# Patient Record
Sex: FEMALE | Race: WHITE | Employment: OTHER | URBAN - NONMETROPOLITAN AREA
[De-identification: names, ages, dates, MRNs, and addresses within clinical notes are randomized per-mention and may not be internally consistent; named-entity substitution may affect disease eponyms.]

---

## 2017-10-17 ENCOUNTER — HOSPITAL ENCOUNTER (OUTPATIENT)
Dept: NUTRITION | Age: 72
Discharge: HOME | End: 2017-10-17
Payer: MEDICARE

## 2017-10-17 PROCEDURE — 97802 MEDICAL NUTRITION INDIV IN: CPT

## 2017-10-17 NOTE — PROGRESS NOTES
MNT provided for Diabetes    Limited adherence to nutrition-related recommendations related to Lack of value or competing values for behavior change as evidenced by Previous goals unmet/expected outcomes not achieved    Client data    Ht: 68\" Wt: 215# BMI: 32.7 (obese I)   Weight changes: unk ABW: 72.2 kg   BMR: 1333 calories Est. total calorie needs: ~1400       Client overall goal for weight is for losses towards a healthier BMI. Current eating pattern is fairly consistent, per recall, but as meeting progresses, more abberrations in timing, and choices are revealed. Use of whole grains, whole fruits and vegetables appear near recommended quantities. There is an excess of portions (likely) per recall, as client doesn't actively measure foods, and doesn't routinely look at food labels. She has had diabetes education in past (2014). Activity is lower than recommendations, with 3 times a week walking reported. Only on Glimepiride, formerly on metformin as well. Doesn't want to go on insulin. Not routinely checking glucose. Not routinely having a HS snack. Client presents for MNT today with self. Client was educated on carbohydrate counting with the following goals at meals and snacks:  Breakfast: 45 grams  Lunch: 45 grams  Supper: 30-45 grams  Bedtime Snack: 15 grams    Client received information on limiting fat in diet to lessen heart disease risk, with goals of: Total Fat: 58 grams  Saturated Fat: 11 grams  Trans Fat: 0 grams daily     Discussed and provided literature on:  Carbohydrate counting, utilizing materials: Choose Your Foods book for meal planning, Food Label, MyPlate and individual carbohydrate counts (as noted above). Reinforced importance of measuring portions and reading food labels for Total Carbohydrate and Serving Sizes. Discussed importance of good record keeping for her glucose values and checking her glucose as her doctor has prescribed (doesn't like pricking fingers).   She

## 2020-01-15 ENCOUNTER — HOSPITAL ENCOUNTER (OUTPATIENT)
Dept: WOMENS IMAGING | Age: 75
Discharge: HOME | End: 2020-01-17
Payer: MEDICARE

## 2020-01-15 PROCEDURE — 77080 DXA BONE DENSITY AXIAL: CPT

## 2020-01-21 ENCOUNTER — HOSPITAL ENCOUNTER (OUTPATIENT)
Dept: WOMENS IMAGING | Age: 75
Discharge: HOME | End: 2020-01-23
Payer: MEDICARE

## 2020-01-21 PROCEDURE — 77067 SCR MAMMO BI INCL CAD: CPT

## 2020-01-31 ENCOUNTER — HOSPITAL ENCOUNTER (OUTPATIENT)
Dept: WOMENS IMAGING | Age: 75
Discharge: HOME | End: 2020-02-02
Payer: MEDICARE

## 2020-01-31 ENCOUNTER — HOSPITAL ENCOUNTER (OUTPATIENT)
Dept: ULTRASOUND IMAGING | Age: 75
Discharge: HOME | End: 2020-02-02
Payer: MEDICARE

## 2020-01-31 PROCEDURE — 76641 ULTRASOUND BREAST COMPLETE: CPT

## 2020-01-31 PROCEDURE — 77065 DX MAMMO INCL CAD UNI: CPT

## 2020-07-31 ENCOUNTER — HOSPITAL ENCOUNTER (OUTPATIENT)
Dept: ULTRASOUND IMAGING | Age: 75
Discharge: HOME | End: 2020-08-02
Payer: MEDICARE

## 2020-07-31 PROCEDURE — 76641 ULTRASOUND BREAST COMPLETE: CPT

## 2020-08-12 ENCOUNTER — HOSPITAL ENCOUNTER (OUTPATIENT)
Dept: LAB | Age: 75
Setting detail: SPECIMEN
Discharge: HOME | End: 2020-08-12
Payer: MEDICARE

## 2020-08-12 DIAGNOSIS — Z01.818 PREOP TESTING: Primary | ICD-10-CM

## 2020-08-12 PROCEDURE — U0003 INFECTIOUS AGENT DETECTION BY NUCLEIC ACID (DNA OR RNA); SEVERE ACUTE RESPIRATORY SYNDROME CORONAVIRUS 2 (SARS-COV-2) (CORONAVIRUS DISEASE [COVID-19]), AMPLIFIED PROBE TECHNIQUE, MAKING USE OF HIGH THROUGHPUT TECHNOLOGIES AS DESCRIBED BY CMS-2020-01-R: HCPCS

## 2020-08-14 LAB — SARS-COV-2, NAA: NOT DETECTED

## 2020-08-15 ENCOUNTER — TELEPHONE (OUTPATIENT)
Dept: PRIMARY CARE CLINIC | Age: 75
End: 2020-08-15

## 2020-08-19 ENCOUNTER — HOSPITAL ENCOUNTER (OUTPATIENT)
Dept: ULTRASOUND IMAGING | Age: 75
Discharge: HOME | End: 2020-08-21
Payer: MEDICARE

## 2020-08-19 ENCOUNTER — HOSPITAL ENCOUNTER (OUTPATIENT)
Dept: WOMENS IMAGING | Age: 75
Discharge: HOME | End: 2020-08-21
Payer: MEDICARE

## 2020-08-19 VITALS
SYSTOLIC BLOOD PRESSURE: 197 MMHG | OXYGEN SATURATION: 98 % | HEART RATE: 68 BPM | DIASTOLIC BLOOD PRESSURE: 78 MMHG | RESPIRATION RATE: 12 BRPM

## 2020-08-19 PROCEDURE — 77065 DX MAMMO INCL CAD UNI: CPT

## 2020-08-19 PROCEDURE — 88305 TISSUE EXAM BY PATHOLOGIST: CPT

## 2020-08-19 PROCEDURE — 2500000003 HC RX 250 WO HCPCS: Performed by: RADIOLOGY

## 2020-08-19 PROCEDURE — 2709999900 US BREAST BIOPSY W LOC DEVICE 1ST LESION LEFT

## 2020-08-19 RX ORDER — LIDOCAINE HYDROCHLORIDE 10 MG/ML
INJECTION, SOLUTION EPIDURAL; INFILTRATION; INTRACAUDAL; PERINEURAL
Status: COMPLETED | OUTPATIENT
Start: 2020-08-19 | End: 2020-08-19

## 2020-08-19 RX ADMIN — LIDOCAINE HYDROCHLORIDE 10 ML: 10 INJECTION, SOLUTION EPIDURAL; INFILTRATION; INTRACAUDAL; PERINEURAL at 11:31

## 2020-08-21 LAB — SURGICAL PATHOLOGY REPORT: NORMAL

## 2020-09-15 ENCOUNTER — HOSPITAL ENCOUNTER (OUTPATIENT)
Dept: LAB | Age: 75
Setting detail: SPECIMEN
Discharge: HOME | End: 2020-09-15
Payer: MEDICARE

## 2020-09-15 DIAGNOSIS — Z01.818 PREOP TESTING: Primary | ICD-10-CM

## 2020-09-15 PROCEDURE — C9803 HOPD COVID-19 SPEC COLLECT: HCPCS

## 2020-09-15 PROCEDURE — U0003 INFECTIOUS AGENT DETECTION BY NUCLEIC ACID (DNA OR RNA); SEVERE ACUTE RESPIRATORY SYNDROME CORONAVIRUS 2 (SARS-COV-2) (CORONAVIRUS DISEASE [COVID-19]), AMPLIFIED PROBE TECHNIQUE, MAKING USE OF HIGH THROUGHPUT TECHNOLOGIES AS DESCRIBED BY CMS-2020-01-R: HCPCS

## 2020-09-17 LAB — SARS-COV-2, NAA: NOT DETECTED

## 2021-02-02 ENCOUNTER — HOSPITAL ENCOUNTER (OUTPATIENT)
Dept: LAB | Age: 76
Setting detail: SPECIMEN
Discharge: HOME | End: 2021-02-02
Payer: MEDICARE

## 2021-02-02 PROCEDURE — U0003 INFECTIOUS AGENT DETECTION BY NUCLEIC ACID (DNA OR RNA); SEVERE ACUTE RESPIRATORY SYNDROME CORONAVIRUS 2 (SARS-COV-2) (CORONAVIRUS DISEASE [COVID-19]), AMPLIFIED PROBE TECHNIQUE, MAKING USE OF HIGH THROUGHPUT TECHNOLOGIES AS DESCRIBED BY CMS-2020-01-R: HCPCS

## 2021-02-02 PROCEDURE — U0005 INFEC AGEN DETEC AMPLI PROBE: HCPCS

## 2021-02-02 PROCEDURE — C9803 HOPD COVID-19 SPEC COLLECT: HCPCS

## 2021-02-03 LAB
SARS-COV-2, RAPID: NORMAL
SARS-COV-2: NORMAL
SARS-COV-2: NOT DETECTED
SOURCE: NORMAL

## 2021-02-04 ENCOUNTER — TELEPHONE (OUTPATIENT)
Dept: PRIMARY CARE CLINIC | Age: 76
End: 2021-02-04

## 2021-06-01 ENCOUNTER — HOSPITAL ENCOUNTER (OUTPATIENT)
Age: 76
Discharge: HOME | End: 2021-06-03
Payer: MEDICARE

## 2021-06-01 ENCOUNTER — HOSPITAL ENCOUNTER (OUTPATIENT)
Dept: GENERAL RADIOLOGY | Age: 76
Discharge: HOME | End: 2021-06-03
Payer: MEDICARE

## 2021-06-01 DIAGNOSIS — M54.9 BACK PAIN, UNSPECIFIED BACK LOCATION, UNSPECIFIED BACK PAIN LATERALITY, UNSPECIFIED CHRONICITY: ICD-10-CM

## 2021-06-01 PROCEDURE — 72100 X-RAY EXAM L-S SPINE 2/3 VWS: CPT

## 2021-06-07 ENCOUNTER — HOSPITAL ENCOUNTER (OUTPATIENT)
Dept: GENERAL RADIOLOGY | Age: 76
Discharge: HOME | End: 2021-06-09
Payer: MEDICARE

## 2021-06-07 ENCOUNTER — HOSPITAL ENCOUNTER (OUTPATIENT)
Age: 76
Discharge: HOME | End: 2021-06-09
Payer: MEDICARE

## 2021-06-07 DIAGNOSIS — R07.81 RIB PAIN: ICD-10-CM

## 2021-06-07 PROCEDURE — 72072 X-RAY EXAM THORAC SPINE 3VWS: CPT

## 2021-06-08 ENCOUNTER — HOSPITAL ENCOUNTER (OUTPATIENT)
Dept: PHYSICAL THERAPY | Age: 76
Setting detail: THERAPIES SERIES
Discharge: HOME | End: 2021-06-08
Payer: MEDICARE

## 2021-06-08 PROCEDURE — G0283 ELEC STIM OTHER THAN WOUND: HCPCS

## 2021-06-08 PROCEDURE — 97161 PT EVAL LOW COMPLEX 20 MIN: CPT

## 2021-06-08 PROCEDURE — 97110 THERAPEUTIC EXERCISES: CPT

## 2021-06-08 ASSESSMENT — PAIN SCALES - QUEBEC BACK PAIN DISABILITY SCALE
THROW A BALL: 5
REACH UP TO HIGH SHELVES: 1
STAND UP FOR 20 TO 30 MINUTES: 3
PULL OR PUSH HEAVY DOORS: 2
TAKE FOOD OUT OF THE REFRIGERATOR: 2
PUT ON SOCKS OR PANYHOSE: 2
MAKE YOUR BED: 3
TOTAL SCORE: 60
WALK A FEW BLOCKS OR 300 TO 400M: 3
LIFT AND CARRY A HEAVY SUITCASE: 4
RIDE IN A CAR: 2
MOVE A CHAIR: 2
SIT IN A CHAIR FOR SEVERAL HOURS: 4
QUEBEC CMS MODIFIER: CL
TURN OVER IN BED: 2
WALK SEVERAL KILOMETERS  OR MILES: 4
CLIMB ONE FLIGHT OF STAIRS: 3
CARRY TWO BAGS OF GROCERIES: 2
SLEEP THROUGH THE NIGHT: 4
GET OUT OF BED: 3
RUN ONE BLOCK OR 100M: 5
BEND OVER TO CLEAN THE BATHTUB: 4
QUEBEC DISABILITY INDEX: 60-79%

## 2021-06-08 NOTE — PLAN OF CARE
[facility]           Phone: [phone]             Outpatient Physical Therapy  Fax: [phone]                                           Date: 2021  Patient: Rose Patino : 1945 [facility] #: [id number]   Referring Practitioner:  ELIZ Retana Referral Date:  21       [x] Plan of Care   [] Updated Plan of Care    Dates of Service to Include: 2021 to 21    Diagnosis:  Lumbar back pain, M54.5    Rehab (Treatment) Diagnosis:  Low back pain             Onset Date:  21    Attendance  Total # of Visits to Date: 1 No Show: 0 Canceled Appointment: 0    Assessment  Assessment: Patient is 77year old female with dx of lumbar back pain who presents with increased pain 5/10 on average in mid and lower back. Pt sits with thoracic kyphosis and lumbar hyperlordosis. TTP L1-5, S1, B PSIS and lumbar paraspinals. Patient with decresaed lumbar AROM:flexion: to toes with pain; B SB to lateral knee joint line with increased pain. Patient with decreased core strength 3+/5 grossly and decreased B LE strength:Hip flexion: 4-/5, abd/add: 4/5; Knee flex/ext: 4/5. (-) B JOSÉ MIGUEL, SLR, slump tests; good pelvic alignment, relief with manual lumbar traction in supine. Patient to benefit from physical therapy 2x/wk for 6 weeks to improve core strength and lumbar stability and to decrease pain to return to PLOF. Goals  Short term goals  Time Frame for Short term goals: 3 weeks  Short term goal 1: Patient to initiate HEP for improved lumbar aROM and core strength. Short term goal 2: Patient to be instructed in core strengthening exercises to improve lumbar stability. Short term goal 3: Initiate manual techniques/modalities prn to decrease pain and improve mobility. Long term goals  Time Frame for Long term goals : 6 weeks  Long term goal 1: Patient to be independent and compliant with HEP.   Long term goal 2: Patient to have improved core and B hip strength >/=4/5 grossly for improved lumbar stability. Long term goal 3: Patient to report overall decrease in pain </=2/10 at worst to improve QOL.      Prognosis  Prognosis: Good    Treatment Plan   Times per week: 2  Plan weeks: 6  [x] HP/CP      [x] Electrical Stim   [x] Therapeutic Exercise      [x] Gait Training  [x] Aquatics   [] Ultrasound         [x] Patient Education/HEP   [x] Manual Therapy  [] Traction    [x] Neuro-radha        [x] Soft Tissue Mobs            [] Home TENS  [] Iontophoresis    [] Orthotic casting/fitting      [] Dry Needling             Electronically signed by: Manan Patrick PT, DPT    Date: 6/8/2021      ______________________________________ Date: 6/8/2021   Physician Signature

## 2021-06-08 NOTE — PROGRESS NOTES
min to lower and mid back to decrease pain and tone  E-stim (parameters): IFC x15 min in sitting to decrease pain in lower back     Functional Outcome Measures  Get out of bed: Fairly difficult  Sleep through the night: Very difficult  Turn over in bed: Somewhat difficult  Ride in a car: Somewhat difficult  Stand up for 20-30 minutes: Fairly difficult  Sit in a chair for several hours: Very difficult  Climb one flight of stairs: Fairly difficult  Walk a few blocks (300-400 m): Fairly difficult  Walk several kilometers - miles: Very difficult  Reach up to high shelves: Minimally difficult  Throw a ball: Unable to do  Run one block (about 100 m): Unable to do  Take food out of the refrigerator: Somewhat difficult  Make your bed: Fairly difficult  Put on socks (pantyhose): Somewhat difficult  Bend over to clean the bathtub: Very difficult  Move a chair: Somewhat difficult  Pull or push heavy doors: Somewhat difficult  Carry two bags of groceries: Somewhat difficult  Lift and carry a heavy suitcase: Very difficult  Nathalie Total Score: 60    Assessment  Assessment: Patient is 77year old female with dx of lumbar back pain who presents with increased pain 5/10 on average in mid and lower back. Pt sits with thoracic kyphosis and lumbar hyperlordosis. TTP L1-5, S1, B PSIS and lumbar paraspinals. Patient with decresaed lumbar AROM:flexion: to toes with pain; B SB to lateral knee joint line with increased pain. Patient with decreased core strength 3+/5 grossly and decreased B LE strength:Hip flexion: 4-/5, abd/add: 4/5; Knee flex/ext: 4/5. (-) B JOSÉ MIGUEL, SLR, slump tests; good pelvic alignment, relief with manual lumbar traction in supine. Patient to benefit from physical therapy 2x/wk for 6 weeks to improve core strength and lumbar stability and to decrease pain to return to PLOF.   Prognosis: Good        Decision Making: Low Complexity    Patient Education  PT eval, POC, HEP    Pt verbalized/demonstrated good understanding: [X] Yes         [] No, pt required further clarification. Goals  Short term goals  Time Frame for Short term goals: 3 weeks  Short term goal 1: Patient to initiate HEP for improved lumbar aROM and core strength. Short term goal 2: Patient to be instructed in core strengthening exercises to improve lumbar stability. Short term goal 3: Initiate manual techniques/modalities prn to decrease pain and improve mobility. Long term goals  Time Frame for Long term goals : 6 weeks  Long term goal 1: Patient to be independent and compliant with HEP. Long term goal 2: Patient to have improved core and B hip strength >/=4/5 grossly for improved lumbar stability. Long term goal 3: Patient to report overall decrease in pain </=2/10 at worst to improve QOL. Patient goals :  \"Get rid of pain and better mobility\"        Minutes Tracking:  Time In: 1350  Time Out: 1436  Minutes: 46  Timed Code Treatment Minutes: MISHEL Pelayo, DPT    6/8/2021

## 2021-06-17 ENCOUNTER — HOSPITAL ENCOUNTER (OUTPATIENT)
Dept: PHYSICAL THERAPY | Age: 76
Setting detail: THERAPIES SERIES
Discharge: HOME | End: 2021-06-17
Payer: MEDICARE

## 2021-06-17 PROCEDURE — G0283 ELEC STIM OTHER THAN WOUND: HCPCS

## 2021-06-17 PROCEDURE — 97110 THERAPEUTIC EXERCISES: CPT

## 2021-06-17 PROCEDURE — 97140 MANUAL THERAPY 1/> REGIONS: CPT

## 2021-06-22 ENCOUNTER — HOSPITAL ENCOUNTER (OUTPATIENT)
Dept: PHYSICAL THERAPY | Age: 76
Setting detail: THERAPIES SERIES
Discharge: HOME | End: 2021-06-22
Payer: MEDICARE

## 2021-06-22 PROCEDURE — G0283 ELEC STIM OTHER THAN WOUND: HCPCS

## 2021-06-22 PROCEDURE — 97140 MANUAL THERAPY 1/> REGIONS: CPT

## 2021-06-22 PROCEDURE — 97110 THERAPEUTIC EXERCISES: CPT

## 2021-06-22 NOTE — PROGRESS NOTES
Phone: Timothy           Fax: [phone]                           Outpatient Physical Therapy                                                                            Daily Note    Patient: Sim Dale : 1945  CSN #: [id number]   Referring Practitioner:  ELIZ Tolnetino    Referral Date : 21     Date: 2021    Diagnosis: Lumbar back pain, M54.5  Treatment Diagnosis: Low back pain    Onset Date: 21  PT Insurance Information: Medicare/Humana supplement  Total # of Visits Approved: 12 Per Physician Order  Total # of Visits to Date: 3  No Show: 0  Canceled Appointment: 0    Pre-Treatment Pain:  4/10  Subjective: Pt states she continues to feel a little better but she still is getting some pain in her LB and sides. Pt rates current pain a 4/10. Exercises:  Exercise 1: HEP: LTR, SKTC, glute sets, PPT  Exercise 2: SciFit Bike 6 min  Exercise 3: ball rollouts 3x30  Exercise 4: ball ab iso 10x 10 sec  Exercise 5: Bridges 10x  Exercise 6: PPT, PPT march 10x ea  Exercise 7: LTR 15x  Exercise 8: LAE/ Rows/ TA reaches 15xea    Manual:  Manual traction: Supine manual lumbar traction with therapy ball to relieve back pain    Modalities:  Moist heat: HP with IFC x15 min to lower and mid back to decrease pain and tone  E-stim (parameters): IFC x15 min in sitting to decrease pain in lower back     Assessment  Assessment: Conitnued light strengthening and core stabilty ex today with fair tolerance. Decreased pain noted with manual techniques today. Will continue. Activity Tolerance  Activity Tolerance: Patient Tolerated treatment well    Patient Education  Patient Education: Things to focus on at home. Pt verbalized/demonstrated good understanding:     [x] Yes         [] No, pt required further clarification.      Post Treatment Pain:  4/10    Plan  Times per week: 2  Plan weeks: 6    Goals  (Total # of Visits to Date: 3)      Short term goals  Time Frame for Short term goals: 3 weeks  Short term goal 1: Patient to initiate HEP for improved lumbar aROM and core strength. -MET  Short term goal 2: Patient to be instructed in core strengthening exercises to improve lumbar stability. -MET  Short term goal 3: Initiate manual techniques/modalities prn to decrease pain and improve mobility. -MET    Long term goals  Time Frame for Long term goals : 6 weeks  Long term goal 1: Patient to be independent and compliant with HEP. Long term goal 2: Patient to have improved core and B hip strength >/=4/5 grossly for improved lumbar stability. Long term goal 3: Patient to report overall decrease in pain </=2/10 at worst to improve QOL.     Minutes Tracking:  Time In: 1146  Time Out: 1250  Minutes: 64  Timed Code Treatment Minutes: Axel Corley , [place]         Date: 6/22/2021

## 2021-06-24 ENCOUNTER — HOSPITAL ENCOUNTER (OUTPATIENT)
Dept: PHYSICAL THERAPY | Age: 76
Setting detail: THERAPIES SERIES
Discharge: HOME | End: 2021-06-24
Payer: MEDICARE

## 2021-06-24 PROCEDURE — G0283 ELEC STIM OTHER THAN WOUND: HCPCS

## 2021-06-24 PROCEDURE — 97140 MANUAL THERAPY 1/> REGIONS: CPT

## 2021-06-24 PROCEDURE — 97110 THERAPEUTIC EXERCISES: CPT

## 2021-06-24 NOTE — PROGRESS NOTES
Phone: Timothy           Fax: [phone]                           Outpatient Physical Therapy                                                                            Daily Note    Patient: Norm Enriquez : 1945  CSN #: [id number]   Referring Practitioner:  ELIZ Doe    Referral Date : 21     Date: 2021    Diagnosis: Lumbar back pain, M54.5  Treatment Diagnosis: Low back pain    Onset Date: 21  PT Insurance Information: Medicare/Humana supplement  Total # of Visits Approved: 12 Per Physician Order  Total # of Visits to Date: 4  No Show: 0  Canceled Appointment: 0    Pre-Treatment Pain:  4/10  Subjective: Pt reports she was a little sore in her core following last visit but she thinks shes getting better. Pt rates current pain a 3-4/10. Exercises:  Exercise 2: SciFit Bike 6 min  Exercise 3: ball rollouts 3x30  Exercise 5: Bridges 10x  Exercise 6: PPT, PPT march 10x ea  Exercise 7: LTR 15x  Exercise 8: LAE/ Rows/ TA reaches 15xea  Exercise 9: Joystick 4 ways 10x ea  Exercise 10: sideaways walking at counter 5x  Exercise 11: Harmony taps forwards/sideways 10x ea  Exercise 12: supine clamshell GTB 10x2    Manual:  Manual traction: Supine manual lumbar traction with therapy ball to relieve back pain    Modalities:  Moist heat: HP with IFC x15 min to lower and mid back to decrease pain and tone     Assessment  Assessment: increased core and hip exercises in order to keep progressing towards long term goals, reported improvemnt from patient regarding back pain but core fatigue and soreness noted with exercise. Activity Tolerance  Activity Tolerance: Patient Tolerated treatment well    Patient Education  Patient Education: Things to focus on at home. Pt verbalized/demonstrated good understanding:     [x] Yes         [] No, pt required further clarification.      Post Treatment Pain:  4/10    Plan  Times per week: 2  Plan weeks:

## 2021-06-30 ENCOUNTER — HOSPITAL ENCOUNTER (OUTPATIENT)
Dept: PHYSICAL THERAPY | Age: 76
Setting detail: THERAPIES SERIES
Discharge: HOME | End: 2021-06-30
Payer: MEDICARE

## 2021-06-30 PROCEDURE — 97110 THERAPEUTIC EXERCISES: CPT

## 2021-06-30 PROCEDURE — G0283 ELEC STIM OTHER THAN WOUND: HCPCS

## 2021-07-01 ENCOUNTER — APPOINTMENT (OUTPATIENT)
Dept: PHYSICAL THERAPY | Age: 76
End: 2021-07-01
Payer: MEDICARE

## 2021-07-01 NOTE — PROGRESS NOTES
3/10    Plan  Times per week: 2  Plan weeks: 6    Goals  (Total # of Visits to Date: 5)      Short term goals  Time Frame for Short term goals: 3 weeks  Short term goal 1: Patient to initiate HEP for improved lumbar aROM and core strength. -MET  Short term goal 2: Patient to be instructed in core strengthening exercises to improve lumbar stability. -MET  Short term goal 3: Initiate manual techniques/modalities prn to decrease pain and improve mobility. -MET    Long term goals  Time Frame for Long term goals : 6 weeks  Long term goal 1: Patient to be independent and compliant with HEP. Long term goal 2: Patient to have improved core and B hip strength >/=4/5 grossly for improved lumbar stability. Long term goal 3: Patient to report overall decrease in pain </=2/10 at worst to improve QOL.     Minutes Tracking:  Time In: [address] [facility]  Time Out: 1805  Minutes: 63  Timed Code Treatment Minutes: Malgorzata Casanova [facility], [place]         Date: 6/30/2021

## 2021-07-06 ENCOUNTER — HOSPITAL ENCOUNTER (OUTPATIENT)
Dept: PHYSICAL THERAPY | Age: 76
Setting detail: THERAPIES SERIES
Discharge: HOME | End: 2021-07-06
Payer: MEDICARE

## 2021-07-06 PROCEDURE — 97110 THERAPEUTIC EXERCISES: CPT

## 2021-07-06 PROCEDURE — G0283 ELEC STIM OTHER THAN WOUND: HCPCS

## 2021-07-06 NOTE — PROGRESS NOTES
Phone: Timothy           Fax: [phone]                           Outpatient Physical Therapy                                                                            Daily Note    Patient: Jazzy Gillespie : 1945  CSN #: [id number]   Referring Practitioner:  ELIZ Call    Referral Date : 21     Date: 2021    Diagnosis: Lumbar back pain, M54.5  Treatment Diagnosis: Low back pain    Onset Date: 21  PT Insurance Information: Medicare/Humana supplement  Total # of Visits Approved: 12 Per Physician Order  Total # of Visits to Date: 6  No Show: 0  Canceled Appointment: 0    Pre-Treatment Pain:  3/10  Subjective: Pt reports she had a busy weekend overall but her back isnt doing to bad today. Pt rates current pain a 3/10. Exercises:  Exercise 2: SciFit Bike 8 min  Exercise 5: Bridges 10x  Exercise 6: PPT, PPT march 10x ea  Exercise 7: LTR 15x  Exercise 8: LAE/ Rows/ TA reaches 15xea BTB  Exercise 9: Joystick 4 ways 10x ea  Exercise 10: sideaways walking at counter 5x GTB  Exercise 11: Harmony taps forwards/sideways 10x ea  Exercise 12: supine clamshell GTB 10x2  Exercise 13: FSU 6 inch 15x ea    Manual:  Manual traction: Supine manual lumbar traction with therapy ball to relieve back pain    Modalities:  Moist heat: HP with IFC x15 min to lower and mid back to decrease pain and tone  E-stim (parameters): IFC x15 min in sitting to decrease pain in lower back     Assessment  Assessment: Continued working on core and functional strengthening exercises today with fiar tolerance noted. Pt reports back pain overall is getting much better unless she stands for longer periods of time but off balance feeling remains. Activity Tolerance  Activity Tolerance: Patient Tolerated treatment well    Patient Education  Patient Education: Continue HEP.   Pt verbalized/demonstrated good understanding:     [x] Yes         [] No, pt required further clarification. Post Treatment Pain:  2/10    Plan  Times per week: 2  Plan weeks: 6    Goals  (Total # of Visits to Date: 6)      Short term goals  Time Frame for Short term goals: 3 weeks  Short term goal 1: Patient to initiate HEP for improved lumbar aROM and core strength. -MET  Short term goal 2: Patient to be instructed in core strengthening exercises to improve lumbar stability. -MET  Short term goal 3: Initiate manual techniques/modalities prn to decrease pain and improve mobility. -MET    Long term goals  Time Frame for Long term goals : 6 weeks  Long term goal 1: Patient to be independent and compliant with HEP. Long term goal 2: Patient to have improved core and B hip strength >/=4/5 grossly for improved lumbar stability. Long term goal 3: Patient to report overall decrease in pain </=2/10 at worst to improve QOL.     Minutes Tracking:  Time In: 5808  Time Out: 4262  Minutes: 61  Timed Code Treatment Minutes: [id number] [place], [place]          Date: 7/6/2021

## 2021-07-13 ENCOUNTER — HOSPITAL ENCOUNTER (OUTPATIENT)
Dept: PHYSICAL THERAPY | Age: 76
Setting detail: THERAPIES SERIES
Discharge: HOME | End: 2021-07-13
Payer: MEDICARE

## 2021-07-13 PROCEDURE — 97110 THERAPEUTIC EXERCISES: CPT

## 2021-07-13 PROCEDURE — G0283 ELEC STIM OTHER THAN WOUND: HCPCS

## 2021-07-13 NOTE — PROGRESS NOTES
Phone: Timothy           Fax: [phone]                           Outpatient Physical Therapy                                                                            Daily Note    Patient: Jose Khoury : 1945  CSN #: [id number]   Referring Practitioner:  ELIZ Calle    Referral Date : 21     Date: 2021    Diagnosis: Lumbar back pain, M54.5  Treatment Diagnosis: Low back pain    Onset Date: 21  PT Insurance Information: Medicare/Humana supplement  Total # of Visits Approved: 12 Per Physician Order  Total # of Visits to Date: 7  No Show: 0  Canceled Appointment: 0    Pre-Treatment Pain:  7/10  Subjective: Pt states she isnt to bad today. She states she usually gets sore from the weather and was doing more over the past few days in her loft. Pt rates current pain a 7/10 all over probably from fibromyalgia. Exercises:  Exercise 1: HEP: LTR, SKTC, glute sets, PPT  Exercise 2: SciFit Bike 8 min  Exercise 3: ball rollouts 3x30  Exercise 6: PPT, PPT march 10x ea  Exercise 7: LTR 15x  Exercise 8: LAE/ Rows/ TA reaches 15xea BTB  Exercise 9: Joystick 4 ways 10x ea  Exercise 12: supine clamshell GTB 10x2    Manual:  Manual traction: Supine manual lumbar traction with therapy ball to relieve back pain    Modalities:  Moist heat: HP with IFC x15 min to lower and mid back to decrease pain and tone  E-stim (parameters): IFC x15 min in sitting to decrease pain in lower back     Assessment  Assessment: Limited exercise progressions today d/t increased pain on clinic arrival.  Pt continues to report light dizzy symptoms with transfers from supine<>sit and sit<>stand. Will conitnue. Activity Tolerance  Activity Tolerance: Patient Tolerated treatment well    Patient Education  Patient Education: Continue HEP. Pt verbalized/demonstrated good understanding:     [x] Yes         [] No, pt required further clarification.      Post Treatment

## 2021-07-15 ENCOUNTER — HOSPITAL ENCOUNTER (OUTPATIENT)
Dept: PHYSICAL THERAPY | Age: 76
Setting detail: THERAPIES SERIES
Discharge: HOME | End: 2021-07-15
Payer: MEDICARE

## 2021-07-15 PROCEDURE — G0283 ELEC STIM OTHER THAN WOUND: HCPCS

## 2021-07-15 PROCEDURE — 97140 MANUAL THERAPY 1/> REGIONS: CPT

## 2021-07-15 PROCEDURE — 97110 THERAPEUTIC EXERCISES: CPT

## 2021-07-15 NOTE — PROGRESS NOTES
Phone: Timothy           Fax: [phone]                           Outpatient Physical Therapy                                                                            Daily Note    Patient: Tomasa Cleveland : 1945  [facility] #: [id number]   Referring Practitioner:  ELIZ Langston    Referral Date : 21     Date: 7/15/2021    Diagnosis: Lumbar back pain, M54.5  Treatment Diagnosis: Low back pain    Onset Date: 21  PT Insurance Information: Medicare/Humana supplement  Total # of Visits Approved: 12 Per Physician Order  Total # of Visits to Date: 8  No Show: 0  Canceled Appointment: 0      Pre-Treatment Pain:  2/10  Subjective: Patient reports pain is not too bad today and she denies dizziness. Exercises:  Exercise 1: HEP: LTR, SKTC, glute sets, PPT  Exercise 2: SciFit Bike 10 min  Exercise 3: ball rollouts 3x30  Exercise 6: PPT, PPT march 10x ea  Exercise 7: LTR 15x  Exercise 8: LAE/ Rows/ TA reaches 15xea BTB  Exercise 9: Joystick 4 ways 15x ea  Exercise 12: supine clamshell GTB 10x2    Manual:  Manual traction: Supine manual lumbar traction with therapy ball to relieve back pain    Modalities:  Moist heat: HP with IFC x15 min to lower and mid back to decrease pain and tone  E-stim (parameters): IFC x15 min in sitting to decrease pain in lower back       Assessment  Assessment: Patient able to tolerate all ther ex to improve core strength and lumbar stability. Continued manual lumbar traction and IFC with HP to decrease pain. Patient short on time today d/t needing to leave for another appointment; held testing for vertigo this date as a result. Will continue. Activity Tolerance  Activity Tolerance: Patient Tolerated treatment well    Patient Education  Exercise technique    Pt verbalized/demonstrated good understanding:     [x] Yes         [] No, pt required further clarification.        Post Treatment Pain:  2/10      Plan  Times per week: 2  Plan weeks: 6      Goals  (Total # of Visits to Date: 8)      Short term goals  Time Frame for Short term goals: 3 weeks  Short term goal 1: Patient to initiate HEP for improved lumbar aROM and core strength. -MET  Short term goal 2: Patient to be instructed in core strengthening exercises to improve lumbar stability. -MET  Short term goal 3: Initiate manual techniques/modalities prn to decrease pain and improve mobility. -MET    Long term goals  Time Frame for Long term goals : 6 weeks  Long term goal 1: Patient to be independent and compliant with HEP. Long term goal 2: Patient to have improved core and B hip strength >/=4/5 grossly for improved lumbar stability. Long term goal 3: Patient to report overall decrease in pain </=2/10 at worst to improve QOL.     Minutes Tracking:  Time In: 1972  Time Out: 0093  Minutes: 54  Timed Code Treatment Minutes: [address] [place], [place] , DPT     Date: 7/15/2021

## 2021-07-20 ENCOUNTER — HOSPITAL ENCOUNTER (OUTPATIENT)
Dept: PHYSICAL THERAPY | Age: 76
Setting detail: THERAPIES SERIES
Discharge: HOME | End: 2021-07-20
Payer: MEDICARE

## 2021-07-20 PROCEDURE — 97110 THERAPEUTIC EXERCISES: CPT

## 2021-07-20 PROCEDURE — G0283 ELEC STIM OTHER THAN WOUND: HCPCS

## 2021-07-20 NOTE — PROGRESS NOTES
Phone: Timothy           Fax: [phone]                           Outpatient Physical Therapy                                                                            Daily Note    Patient: Giacomo Tam : 1945  CSN #: [id number]   Referring Practitioner:  ELIZ Gaytan    Referral Date : 21     Date: 2021    Diagnosis: Lumbar back pain, M54.5  Treatment Diagnosis: Low back pain    Onset Date: 21  PT Insurance Information: Medicare/Humana supplement  Total # of Visits Approved: 12 Per Physician Order  Total # of Visits to Date: 9  No Show: 0  Canceled Appointment: 0    Pre-Treatment Pain:  1/10  Subjective: Pt reports she always has pain somewhere but she notices her back pain has decreased some. Pt rates current pain a 1/10. Exercises:  Exercise 1: HEP: LTR, SKTC, glute sets, PPT  Exercise 2: SciFit Bike 10 min  Exercise 3: ball rollouts 3x30  Exercise 8: LAE/ Rows/ TA reaches 15xea BTB  Exercise 9: Joystick 4 ways 15x ea  Exercise 10: sideaways walking at counter 5x GTB  Exercise 11: Harmony taps forwards/sideways 10x ea  Exercise 13: FSU/ LSU 6 inch 15x ea    Modalities:  Moist heat: HP with IFC x15 min to lower and mid back to decrease pain and tone  E-stim (parameters): IFC x15 min in sitting to decrease pain in lower back     Assessment  Assessment: Pt tolerated 45 min ther-ex session today without rest break needed and no increased pain noted. Will continue to progress as tolerable. Activity Tolerance  Activity Tolerance: Patient Tolerated treatment well    Patient Education  Patient Education: Continue HEP. Pt verbalized/demonstrated good understanding:     [x] Yes         [] No, pt required further clarification.      Post Treatment Pain:  1/10    Plan  Times per week: 2  Plan weeks: 6    Goals  (Total # of Visits to Date: 5)      Short term goals  Time Frame for Short term goals: 3 weeks  Short term goal 1: Patient to initiate HEP for improved lumbar aROM and core strength. -MET  Short term goal 2: Patient to be instructed in core strengthening exercises to improve lumbar stability. -MET  Short term goal 3: Initiate manual techniques/modalities prn to decrease pain and improve mobility. -MET    Long term goals  Time Frame for Long term goals : 6 weeks  Long term goal 1: Patient to be independent and compliant with HEP. Long term goal 2: Patient to have improved core and B hip strength >/=4/5 grossly for improved lumbar stability. Long term goal 3: Patient to report overall decrease in pain </=2/10 at worst to improve QOL.     Minutes Tracking:  Time In: 1147  Time Out: 1250  Minutes: 63  Timed Code Treatment Minutes: [place], [place]        Date: 7/20/2021

## 2021-07-22 ENCOUNTER — HOSPITAL ENCOUNTER (OUTPATIENT)
Dept: PHYSICAL THERAPY | Age: 76
Setting detail: THERAPIES SERIES
Discharge: HOME | End: 2021-07-22
Payer: MEDICARE

## 2021-07-22 PROCEDURE — G0283 ELEC STIM OTHER THAN WOUND: HCPCS

## 2021-07-22 PROCEDURE — 97110 THERAPEUTIC EXERCISES: CPT

## 2021-07-22 NOTE — DISCHARGE SUMMARY
Phone: Timothy          Fax: [phone]                            Outpatient Physical Therapy                                                                    Discharge Summary    Patient: Lexx Howell  : 1945  [facility] #: [id number]   Referring physician: No admitting provider for patient encounter. Referring Practitioner: ELIZ Zayas      Diagnosis: Lumbar back pain, M54.5      Date Treatment Initiated: 21  Date of Last Treatment: 21      PT Visit Information  Onset Date: 21  PT Insurance Information: Medicare/Humana supplement  Total # of Visits Approved: 12  Total # of Visits to Date: 10  Plan of Care/Certification Expiration Date: 21  No Show: 0  Canceled Appointment: 0      Frequency/Duration  2 times per week  6 weeks      Treatment Received  [x] HP/CP      [x] Electrical Stim   [x] Therapeutic Exercise      [x] Gait Training  [] Aquatics   [] Ultrasound         [x] Patient Education/HEP   [x] Manual Therapy  [] Traction    [x] Neuro-radha        [x] Soft Tissue Mobs            [] Home TENS  [] Iontophoresis    [] Orthotic casting/fitting      [] Dry Needling    Assessment  Assessment: Patient has attended 10 PT visits for low back pain and met all goals for independence with HEP, improved core and B hip strength 4 to 4+/5 grossly and decreased pain </=2/10 at worst. Patient was tested for Bilateral BPPV and found to be negative with no symptoms and no nystagmus. Will dc patient at this time d/t meeting all goals. Goals  Short term goals  Time Frame for Short term goals: 3 weeks  Short term goal 1: Patient to initiate HEP for improved lumbar aROM and core strength. -MET  Short term goal 2: Patient to be instructed in core strengthening exercises to improve lumbar stability. -MET  Short term goal 3: Initiate manual techniques/modalities prn to decrease pain and improve mobility.  -MET    Long term goals  Time Frame for Long term goals : 6 weeks  Long term goal 1: Patient to be independent and compliant with HEP. -met  Long term goal 2: Patient to have improved core and B hip strength >/=4/5 grossly for improved lumbar stability. -met  Long term goal 3: Patient to report overall decrease in pain </=2/10 at worst to improve QOL. -met      Reason for Discharge  [x] Goals Achieved                        []  Poor Follow Through/Attendance                  [x]  Optimal Function Achieved     []  Patient Discharged Self    []  Hospitalization                         []  Physician discharge      Thank you for this referral      Delroy Alvarado PT, DPT               Date: 7/22/2021

## 2021-07-22 NOTE — PROGRESS NOTES
Phone: Timothy           Fax: [phone]                           Outpatient Physical Therapy                                                                            Daily Note    Patient: Lexx Howell : 1945  CSN #: [id number]   Referring Practitioner:  ELIZ Zayas    Referral Date : 21     Date: 2021    Diagnosis: Lumbar back pain, M54.5  Treatment Diagnosis: Low back pain    Onset Date: 21  PT Insurance Information: Medicare/Humana supplement  Total # of Visits Approved: 12 Per Physician Order  Total # of Visits to Date: 10  No Show: 0  Canceled Appointment: 0      Pre-Treatment Pain:  0/10  Subjective: Patient reports no back pain this date and only mild vertigo symptoms from time to time. Exercises:  Exercise 1: HEP: LTR, SKTC, glute sets, PPT  Exercise 2: SciFit Bike 10 min  Exercise 3: ball rollouts 3x30  Exercise 8: LAE/ Rows/ TA reaches 15xea BTB  Exercise 9: Joystick 4 ways 15x ea  Exercise 10: sideaways walking at counter 5x GTB  Exercise 11: Harmony taps forwards/sideways 10x ea    Modalities:  Moist heat: HP with IFC x15 min to lower and mid back to decrease pain and tone  E-stim (parameters): IFC x15 min in sitting to decrease pain in lower back       Assessment  Assessment: Patient has attended 10 PT visits for low back pain and met all goals for independence with HEP, improved core and B hip strength 4 to 4+/5 grossly and decreased pain </=2/10 at worst. Patient was tested for Bilateral BPPV and found to be negative with no symptoms and no nystagmus. Will dc patient at this time d/t meeting all goals. Activity Tolerance  Activity Tolerance: Patient Tolerated treatment well    Patient Education  Continue HEP upon dc    Pt verbalized/demonstrated good understanding:     [x] Yes         [] No, pt required further clarification.        Post Treatment Pain:  0/10      Plan  Times per week: 2  Plan weeks: 6      Goals  (Total # of Visits to Date: 10)      Short term goals  Time Frame for Short term goals: 3 weeks  Short term goal 1: Patient to initiate HEP for improved lumbar aROM and core strength. -MET  Short term goal 2: Patient to be instructed in core strengthening exercises to improve lumbar stability. -MET  Short term goal 3: Initiate manual techniques/modalities prn to decrease pain and improve mobility. -MET    Long term goals  Time Frame for Long term goals : 6 weeks  Long term goal 1: Patient to be independent and compliant with HEP. -met  Long term goal 2: Patient to have improved core and B hip strength >/=4/5 grossly for improved lumbar stability. -met  Long term goal 3: Patient to report overall decrease in pain </=2/10 at worst to improve QOL. -met    Minutes Tracking:  Time In: 1146  Time Out: 1242  Minutes: 56  Timed Code Treatment Minutes: [facility], PT , DPT     Date: 7/22/2021

## 2021-12-21 ENCOUNTER — HOSPITAL ENCOUNTER (EMERGENCY)
Age: 76
Discharge: HOME | End: 2021-12-22
Attending: FAMILY MEDICINE
Payer: MEDICARE

## 2021-12-21 VITALS
HEART RATE: 93 BPM | DIASTOLIC BLOOD PRESSURE: 92 MMHG | SYSTOLIC BLOOD PRESSURE: 197 MMHG | RESPIRATION RATE: 16 BRPM | TEMPERATURE: 96 F | OXYGEN SATURATION: 97 %

## 2021-12-21 DIAGNOSIS — J12.82 PNEUMONIA DUE TO COVID-19 VIRUS: ICD-10-CM

## 2021-12-21 DIAGNOSIS — E11.65 HYPERGLYCEMIA DUE TO DIABETES MELLITUS (HCC): ICD-10-CM

## 2021-12-21 DIAGNOSIS — I10 ESSENTIAL HYPERTENSION: Primary | ICD-10-CM

## 2021-12-21 DIAGNOSIS — U07.1 PNEUMONIA DUE TO COVID-19 VIRUS: ICD-10-CM

## 2021-12-21 LAB — GLUCOSE BLD-MCNC: 366 MG/DL (ref 74–100)

## 2021-12-21 PROCEDURE — 82947 ASSAY GLUCOSE BLOOD QUANT: CPT

## 2021-12-21 PROCEDURE — 99283 EMERGENCY DEPT VISIT LOW MDM: CPT

## 2021-12-21 PROCEDURE — 96374 THER/PROPH/DIAG INJ IV PUSH: CPT

## 2021-12-22 ENCOUNTER — APPOINTMENT (OUTPATIENT)
Dept: GENERAL RADIOLOGY | Age: 76
End: 2021-12-22
Payer: MEDICARE

## 2021-12-22 LAB
ABSOLUTE EOS #: <0.03 K/UL (ref 0–0.44)
ABSOLUTE IMMATURE GRANULOCYTE: <0.03 K/UL (ref 0–0.3)
ABSOLUTE LYMPH #: 0.86 K/UL (ref 1.1–3.7)
ABSOLUTE MONO #: 0.33 K/UL (ref 0.1–1.2)
ALBUMIN SERPL-MCNC: 3.3 G/DL (ref 3.5–5.2)
ALBUMIN/GLOBULIN RATIO: 0.9 (ref 1–2.5)
ALP BLD-CCNC: 83 U/L (ref 35–104)
ALT SERPL-CCNC: 38 U/L (ref 5–33)
ANION GAP SERPL CALCULATED.3IONS-SCNC: 14 MMOL/L (ref 9–17)
AST SERPL-CCNC: 32 U/L
BASOPHILS # BLD: 0 % (ref 0–2)
BASOPHILS ABSOLUTE: <0.03 K/UL (ref 0–0.2)
BILIRUB SERPL-MCNC: 0.66 MG/DL (ref 0.3–1.2)
BUN BLDV-MCNC: 20 MG/DL (ref 8–23)
BUN/CREAT BLD: 24 (ref 9–20)
CALCIUM SERPL-MCNC: 9.2 MG/DL (ref 8.6–10.4)
CHLORIDE BLD-SCNC: 94 MMOL/L (ref 98–107)
CO2: 20 MMOL/L (ref 20–31)
CREAT SERPL-MCNC: 0.84 MG/DL (ref 0.5–0.9)
DIFFERENTIAL TYPE: ABNORMAL
EOSINOPHILS RELATIVE PERCENT: 0 % (ref 1–4)
ESTIMATED AVERAGE GLUCOSE: 292 MG/DL
GFR AFRICAN AMERICAN: >60 ML/MIN
GFR NON-AFRICAN AMERICAN: >60 ML/MIN
GFR SERPL CREATININE-BSD FRML MDRD: ABNORMAL ML/MIN/{1.73_M2}
GFR SERPL CREATININE-BSD FRML MDRD: ABNORMAL ML/MIN/{1.73_M2}
GLUCOSE BLD-MCNC: 370 MG/DL (ref 70–99)
HBA1C MFR BLD: 11.8 % (ref 4–6)
HCT VFR BLD CALC: 40 % (ref 36.3–47.1)
HEMOGLOBIN: 13.6 G/DL (ref 11.9–15.1)
IMMATURE GRANULOCYTES: 0 %
LACTIC ACID, WHOLE BLOOD: NORMAL MMOL/L (ref 0.7–2.1)
LACTIC ACID: 1.4 MMOL/L (ref 0.5–2.2)
LYMPHOCYTES # BLD: 14 % (ref 24–43)
MCH RBC QN AUTO: 29.8 PG (ref 25.2–33.5)
MCHC RBC AUTO-ENTMCNC: 34 G/DL (ref 28.4–34.8)
MCV RBC AUTO: 87.5 FL (ref 82.6–102.9)
MONOCYTES # BLD: 5 % (ref 3–12)
NRBC AUTOMATED: 0 PER 100 WBC
PDW BLD-RTO: 11.8 % (ref 11.8–14.4)
PLATELET # BLD: 191 K/UL (ref 138–453)
PLATELET ESTIMATE: ABNORMAL
PMV BLD AUTO: 9.5 FL (ref 8.1–13.5)
POTASSIUM SERPL-SCNC: 4.6 MMOL/L (ref 3.7–5.3)
RBC # BLD: 4.57 M/UL (ref 3.95–5.11)
RBC # BLD: ABNORMAL 10*6/UL
SEG NEUTROPHILS: 81 % (ref 36–65)
SEGMENTED NEUTROPHILS ABSOLUTE COUNT: 4.95 K/UL (ref 1.5–8.1)
SODIUM BLD-SCNC: 128 MMOL/L (ref 135–144)
TOTAL PROTEIN: 6.8 G/DL (ref 6.4–8.3)
WBC # BLD: 6.2 K/UL (ref 3.5–11.3)
WBC # BLD: ABNORMAL 10*3/UL

## 2021-12-22 PROCEDURE — 2580000003 HC RX 258: Performed by: FAMILY MEDICINE

## 2021-12-22 PROCEDURE — 96374 THER/PROPH/DIAG INJ IV PUSH: CPT

## 2021-12-22 PROCEDURE — 6370000000 HC RX 637 (ALT 250 FOR IP): Performed by: FAMILY MEDICINE

## 2021-12-22 PROCEDURE — 83605 ASSAY OF LACTIC ACID: CPT

## 2021-12-22 PROCEDURE — 71045 X-RAY EXAM CHEST 1 VIEW: CPT

## 2021-12-22 PROCEDURE — 83036 HEMOGLOBIN GLYCOSYLATED A1C: CPT

## 2021-12-22 PROCEDURE — 80053 COMPREHEN METABOLIC PANEL: CPT

## 2021-12-22 PROCEDURE — 85025 COMPLETE CBC W/AUTO DIFF WBC: CPT

## 2021-12-22 RX ORDER — 0.9 % SODIUM CHLORIDE 0.9 %
500 INTRAVENOUS SOLUTION INTRAVENOUS ONCE
Status: COMPLETED | OUTPATIENT
Start: 2021-12-22 | End: 2021-12-22

## 2021-12-22 RX ADMIN — SODIUM CHLORIDE 500 ML: 9 INJECTION, SOLUTION INTRAVENOUS at 01:09

## 2021-12-22 RX ADMIN — INSULIN HUMAN 8 UNITS: 100 INJECTION, SOLUTION PARENTERAL at 01:04

## 2021-12-22 ASSESSMENT — ENCOUNTER SYMPTOMS
EYES NEGATIVE: 1
GASTROINTESTINAL NEGATIVE: 1
COUGH: 1
SHORTNESS OF BREATH: 1

## 2021-12-22 NOTE — ED NOTES
Patient  demanding wife's lab results from front window staff, \"not leaving until he gets the results'. He was notified that we can not disclose her personal lab results due to [place]. We also let them know that if these records are something that she would like to request she can obtain them from medical records and they are also available in My Chart.       Marcel Eckert RN  12/22/21 [id number]

## 2021-12-22 NOTE — ED PROVIDER NOTES
[facility] ED  EMERGENCY DEPARTMENT ENCOUNTER      Pt Name: David Colunga  MRN: [id number]  [place] 1945  Date of evaluation: 12/21/2021  Provider: German Hopkins MD    [address] [place] [address]     Chief Complaint   Patient presents with    Positive For Covid-19     patient tested positive for covid on 12/821. had antibody infusion last week.  Hyperglycemia     blood sugar around 400 at home. HISTORY OF PRESENT ILLNESS   (Location/Symptom, Timing/Onset, Context/Setting,Quality, Duration, Modifying Factors, Severity)  Note limiting factors. David Colunga is a75 y.o. female who presents to the emergency department      Patient is a 77years old female with a history of diabetes type 2 presented to ER due to the fact that her glucose was elevated to over three hundred. She takes Metformin for diabetes. On the eighth of this month she was diagnosed with COVID-19 infection. Following that she received the monoclonal antibody infusion and now she is here as mentioned because her elevated glucose. She reports that her Covid symptoms are improving she is not quite as short of breath as she used to be, her appetite is increasing slowly, she is still fairly weak however she denies any muscle aches and pains. She believes that Sarah Journey is heading in the right direction\". Nursing Notes werereviewed. REVIEW OF SYSTEMS    (2-9 systems for level 4, 10 or more for level 5)     Review of Systems   Constitutional: Positive for appetite change, chills and fatigue. HENT: Positive for congestion. Eyes: Negative. Respiratory: Positive for cough and shortness of breath. Patient reports also dry cough with occasionally productive of clear/yellowish exudate. Cardiovascular: Negative. Gastrointestinal: Negative. Endocrine: Negative. Genitourinary: Negative. Musculoskeletal: Negative. Skin: Negative. Neurological: Negative. Hematological: Negative. Psychiatric/Behavioral: Negative. Except as noted above the remainder of the review of systems was reviewed and negative. PAST MEDICAL HISTORY     Past Medical History:   Diagnosis Date    Diabetes mellitus ([address]     Hypertension          SURGICALHISTORY       Past Surgical History:   Procedure Laterality Date    ANKLE SURGERY      US BREAST NEEDLE BIOPSY LEFT  8/19/2020    US BREAST NEEDLE BIOPSY LEFT 8/19/2020 [facility]         CURRENT MEDICATIONS       Discharge Medication List as of 12/22/2021  2:02 AM      CONTINUE these medications which have NOT CHANGED    Details   Losartan Potassium (COZAAR PO) Take  by mouth daily. BISOPROLOL-HYDROCHLOROTHIAZIDE PO Take  by mouth daily. metformin (GLUCOPHAGE) 500 MG tablet Take 500 mg by mouth daily (with breakfast). Patient has no known allergies. FAMILY HISTORY     History reviewed. No pertinent family history. SOCIAL HISTORY       Social History     Socioeconomic History    Marital status:      Spouse name: None    Number of children: None    Years of education: None    Highest education level: None   Occupational History    None   Tobacco Use    Smoking status: Never Smoker    Smokeless tobacco: Never Used   Substance and Sexual Activity    Alcohol use: None    Drug use: None    Sexual activity: None   Other Topics Concern    None   Social History Narrative    None     Social Determinants of Health     Financial Resource Strain:     Difficulty of Paying Living Expenses: Not on file   Food Insecurity:     Worried About Running Out of Food in the Last Year: Not on file    Lacy of Food in the Last Year: Not on file   Transportation Needs:     Lack of Transportation (Medical): Not on file    Lack of Transportation (Non-Medical):  Not on file   Physical Activity:     Days of Exercise per Week: Not on file    Minutes of Exercise per Session: Not on file   Stress:     Feeling of Stress : Not on file   Social Connections:     Frequency of Communication with Friends and Family: Not on file    Frequency of Social Gatherings with Friends and Family: Not on file    Attends [employer] Services: Not on file    Active Member of Clubs or Organizations: Not on file    Attends Club or Organization Meetings: Not on file    Marital Status: Not on file   Intimate Partner Violence:     Fear of Current or Ex-Partner: Not on file    Emotionally Abused: Not on file    Physically Abused: Not on file    Sexually Abused: Not on file   Housing Stability:     Unable to Pay for Housing in the Last Year: Not on file    Number of [place] in the Last Year: Not on file    Unstable Housing in the Last Year: Not on file       SCREENINGS      Marcela Coma Scale  Eye Opening: Spontaneous  Best Verbal Response: Oriented  Best Motor Response: Obeys commands  Marcela Coma Scale Score: 15             PHYSICAL EXAM    (up to 7 for level 4, 8 or more for level 5)     ED Triage Vitals [12/21/21 2315]   BP Temp Temp Source Pulse Resp SpO2 Height Weight   (!) 197/92 96 °F (35.6 °C) Tympanic 93 16 97 % -- --       Physical Exam  Vitals and nursing note reviewed. Constitutional:       General: She is not in acute distress. Appearance: Normal appearance. She is ill-appearing. She is not toxic-appearing or diaphoretic. HENT:      Head: Normocephalic and atraumatic. Nose: Nose normal.      Mouth/Throat:      Mouth: Mucous membranes are moist.   Eyes:      Pupils: Pupils are equal, round, and reactive to light. Cardiovascular:      Rate and Rhythm: Normal rate and regular rhythm. Pulses: Normal pulses. Heart sounds: Normal heart sounds. Pulmonary:      Effort: Pulmonary effort is normal.      Breath sounds: Normal breath sounds. Abdominal:      General: Abdomen is flat. There is no distension. Palpations: There is no mass. Tenderness: There is no abdominal tenderness.  There is no right CVA tenderness, left CVA tenderness, guarding or rebound. Hernia: No hernia is present. Musculoskeletal:         General: Normal range of motion. Cervical back: Normal range of motion and neck supple. Skin:     General: Skin is warm. Capillary Refill: Capillary refill takes less than 2 seconds. Neurological:      General: No focal deficit present. Mental Status: She is alert and oriented to person, place, and time. Cranial Nerves: No cranial nerve deficit. Sensory: No sensory deficit. Motor: No weakness. Coordination: Coordination normal.      Gait: Gait normal.      Deep Tendon Reflexes: Reflexes normal.         DIAGNOSTIC RESULTS     EKG: All EKG's are interpreted by the Emergency Department Physician who either signs orCo-signs this chart in the absence of a cardiologist.        RADIOLOGY:   plain film images such as CT, Ultrasound and MRI are read by the radiologist. Plain radiographic images are visualized and preliminarily interpreted by the emergency physician with the below findings:        Interpretation per the Radiologist below, ifavailable at the time of this note:    XR CHEST (SINGLE VIEW FRONTAL)   Final Result   Ill-defined multifocal pneumonia consistent with a viral/COVID pneumonia.                ED BEDSIDE ULTRASOUND:   Performed by ED Physician - none    LABS:  Labs Reviewed   GLUCOSE, WHOLE BLOOD - Abnormal; Notable for the following components:       Result Value    POC Glucose 366 (*)     All other components within normal limits   CBC WITH AUTO DIFFERENTIAL - Abnormal; Notable for the following components:    Seg Neutrophils 81 (*)     Lymphocytes 14 (*)     Eosinophils % 0 (*)     Absolute Lymph # 0.86 (*)     All other components within normal limits   COMPREHENSIVE METABOLIC PANEL - Abnormal; Notable for the following components:    Glucose 370 (*)     Bun/Cre Ratio 24 (*)     Sodium 128 (*)     Chloride 94 (*)     ALT 38 (*)     AST 32 (*) Albumin 3.3 (*)     Albumin/Globulin Ratio 0.9 (*)     All other components within normal limits   LACTIC ACID, PLASMA   HEMOGLOBIN A1C       All other labs were within normal range ornot returned as of this dictation. EMERGENCY DEPARTMENT COURSE and DIFFERENTIAL DIAGNOSIS/MDM:   Vitals:    Vitals:    12/21/21 2315   BP: (!) 197/92   Pulse: 93   Resp: 16   Temp: 96 °F (35.6 °C)   TempSrc: Tympanic   SpO2: 97%           MDM  Number of Diagnoses or Management Options  Diagnosis management comments: Patient is a 12years old female diagnosed with Covid 19/8 of this month. She reports that although she feels that her symptoms are getting better she still has some shortness of breath, fatigue some decreased appetite. She is really here because her glucose was elevated over 360 and she got concerned. Her chest x-ray showed multifocal pneumonia however her oxygenation was good 97%. We gave her insulin subcutaneously and will try to discharge her to go home and follow-up with her PCP as needed. It is possible that her glucose elevation is due to a stress reaction reflecting her Covid infection. CRITICAL CARE TIME   Total CriticalCare time was  minutes, excluding separately reportable procedures. There was a high probability of clinically significant/life threatening deterioration in the patient's condition which required my urgent intervention. CONSULTS:  None    PROCEDURES:  Unlessotherwise noted below, none     Procedures    FINAL IMPRESSION      1. Essential hypertension    2. Hyperglycemia due to diabetes mellitus ([facility] [address]    3.  Pneumonia due to COVID-19 virus          DISPOSITION/PLAN   DISPOSITION Decision To Discharge 12/22/2021 01:59:41 AM      PATIENT REFERRED TO:  ANTONELLA Aguilar CNP  [address] [facility] [address]  [phone]    In 2 days        DISCHARGE MEDICATIONS:  Discharge Medication List as of 12/22/2021  2:02 AM                 (Please note that portions of this note were

## 2022-01-11 ENCOUNTER — TELEPHONE (OUTPATIENT)
Dept: UROLOGY | Age: 77
End: 2022-01-11

## 2022-01-11 NOTE — TELEPHONE ENCOUNTER
Patient referred by ELIZ Gallegos to urology for incomplete bladder emptying.     Called patient and left message for her to call office and schedule an appointment

## 2022-01-28 ENCOUNTER — HOSPITAL ENCOUNTER (OUTPATIENT)
Age: 77
Discharge: HOME | End: 2022-01-28
Payer: MEDICARE

## 2022-01-28 PROCEDURE — 93005 ELECTROCARDIOGRAM TRACING: CPT | Performed by: NURSE PRACTITIONER

## 2022-01-29 LAB
EKG ATRIAL RATE: 69 BPM
EKG P AXIS: 25 DEGREES
EKG P-R INTERVAL: 178 MS
EKG Q-T INTERVAL: 416 MS
EKG QRS DURATION: 70 MS
EKG QTC CALCULATION (BAZETT): 445 MS
EKG R AXIS: 13 DEGREES
EKG T AXIS: 58 DEGREES
EKG VENTRICULAR RATE: 69 BPM

## 2022-01-29 PROCEDURE — 93010 ELECTROCARDIOGRAM REPORT: CPT | Performed by: INTERNAL MEDICINE

## 2022-04-07 ENCOUNTER — HOSPITAL ENCOUNTER (OUTPATIENT)
Dept: NON INVASIVE DIAGNOSTICS | Age: 77
Discharge: HOME | End: 2022-04-07
Payer: MEDICARE

## 2022-04-07 DIAGNOSIS — I51.7 LVH (LEFT VENTRICULAR HYPERTROPHY): ICD-10-CM

## 2022-04-07 LAB
LV EF: 65 %
LVEF MODALITY: NORMAL

## 2022-04-07 PROCEDURE — 93306 TTE W/DOPPLER COMPLETE: CPT

## 2023-05-01 PROBLEM — U09.9 CHRONIC POST-COVID-19 SYNDROME: Status: ACTIVE | Noted: 2023-05-01

## 2023-05-01 PROBLEM — E11.40 TYPE 2 DIABETES MELLITUS WITH DIABETIC NEUROPATHY, WITHOUT LONG-TERM CURRENT USE OF INSULIN (HCC): Status: ACTIVE | Noted: 2023-05-01

## 2023-05-01 PROBLEM — I10 PRIMARY HYPERTENSION: Status: ACTIVE | Noted: 2023-05-01

## 2023-05-01 PROBLEM — E78.2 MIXED HYPERLIPIDEMIA: Status: ACTIVE | Noted: 2023-05-01

## 2023-05-01 PROBLEM — E03.2 HYPOTHYROIDISM DUE TO MEDICATION: Status: ACTIVE | Noted: 2023-05-01

## 2023-09-06 PROBLEM — J40 BRONCHITIS: Status: ACTIVE | Noted: 2023-09-06

## 2024-02-20 PROBLEM — I34.0 MILD MITRAL REGURGITATION: Status: ACTIVE | Noted: 2022-05-02

## 2024-09-12 ENCOUNTER — HOSPITAL ENCOUNTER (OUTPATIENT)
Dept: GENERAL RADIOLOGY | Age: 79
Discharge: HOME | End: 2024-09-14
Payer: MEDICARE

## 2024-09-12 ENCOUNTER — HOSPITAL ENCOUNTER (OUTPATIENT)
Age: 79
Discharge: HOME | End: 2024-09-14
Payer: MEDICARE

## 2024-09-12 DIAGNOSIS — M54.2 NECK PAIN: ICD-10-CM

## 2024-09-12 DIAGNOSIS — G89.29 CHRONIC LEFT SHOULDER PAIN: ICD-10-CM

## 2024-09-12 DIAGNOSIS — M25.512 CHRONIC LEFT SHOULDER PAIN: ICD-10-CM

## 2024-09-12 PROCEDURE — 73030 X-RAY EXAM OF SHOULDER: CPT

## 2024-09-12 PROCEDURE — 72052 X-RAY EXAM NECK SPINE 6/>VWS: CPT
